# Patient Record
Sex: FEMALE | Race: ASIAN | NOT HISPANIC OR LATINO | URBAN - METROPOLITAN AREA
[De-identification: names, ages, dates, MRNs, and addresses within clinical notes are randomized per-mention and may not be internally consistent; named-entity substitution may affect disease eponyms.]

---

## 2022-06-04 ENCOUNTER — INPATIENT (INPATIENT)
Facility: HOSPITAL | Age: 42
LOS: 1 days | Discharge: HOME | End: 2022-06-06
Attending: OBSTETRICS & GYNECOLOGY | Admitting: OBSTETRICS & GYNECOLOGY
Payer: MEDICAID

## 2022-06-04 VITALS
SYSTOLIC BLOOD PRESSURE: 105 MMHG | DIASTOLIC BLOOD PRESSURE: 64 MMHG | HEART RATE: 78 BPM | TEMPERATURE: 98 F | RESPIRATION RATE: 16 BRPM | OXYGEN SATURATION: 100 %

## 2022-06-04 PROCEDURE — 99285 EMERGENCY DEPT VISIT HI MDM: CPT | Mod: 25

## 2022-06-04 PROCEDURE — 93010 ELECTROCARDIOGRAM REPORT: CPT

## 2022-06-04 NOTE — ED PROVIDER NOTE - NS ED ROS FT
Constitutional: (-) fever   Eyes/ENT: (-) vision changes, (-) hearing changes  Cardiovascular: (-) chest pain, (-) wheezing  Respiratory: (-) cough, (-) shortness of breath  Gastrointestinal:  (+) diarrhea, (+) abdominal cramping  : (-) dysuria   Musculoskeletal: (-) back pain  Integumentary: (-) rash, (-) edema  Neurological: (-)loc  Allergic/Immunologic: (-) pruritus  Endocrine: No history of thyroid disease

## 2022-06-04 NOTE — ED PROVIDER NOTE - ATTENDING CONTRIBUTION TO CARE
Attending note:   After face to face evaluation of this patient, I concur with above noted hx, pe, and care plan for this patient.  Webster: 41 yof  with LMP likely 1st week of March found to have missed Ab on . Pt noted vaginal bleeding and crampy pain today and was seen by OB who gave Cytotec. Pt then had heavy vaginal bleeding 6-8 soaked pads an hour with lightheadedness and syncope witnessed by family x 2, no trauma. Pt denies any chest pain or SOB. Pt also noted some diarrhea  prior to second syncopal episode. Pt is well appearing, no distress, not pale, vitals stable, able to ambulate, clear lungs, RRR, abd soft with no areas of tn, pelvic exam shows no minimal vaginal bleeding and os closed. no edema.  EKG NSR.   Pt with syncopal episode likely vasovagal after vaginal bleeding but vitals stable now and pt not pale (has hx of anemia) - concern for retained products and continued bleeding - labs and US ordered.

## 2022-06-04 NOTE — ED PROVIDER NOTE - PHYSICAL EXAMINATION
Vitals: I have reviewed the patients vital signs  General: tearful but nontoxic appearing  HEENT: Atraumatic, normocephalic, airway patent  Eyes: EOMI, tracking appropriately, no subconj pallor  Neck: no tracheal deviation, no JVD  Chest/Lungs: no trauma, symmetric chest rise, speaking in complete sentences, no WOB  Heart: skin and extremities well perfused, regular rate and rhythm  Neuro: A+Ox3, ambulating without difficulty, CN grossly intact  MSK: strength at baseline in all extremities, no muscle wasting or atrophy  Skin: no cyanosis, no jaundice, no new emergent lesions  Abd: SNT no rebound or guarding

## 2022-06-04 NOTE — ED PROVIDER NOTE - PROGRESS NOTE DETAILS
Rito: pelvic exam chaperone Dr Webster scant blood in vault, one small clot with no POC evacuated, cervix open Rito: pelvic exam chaperone Dr Webster scant blood in vault, one small clot with no POC evacuated, cervical OS closed Darin: Vitals stable, US images reviewed and OB gyn consult called for retained products. Results discussed with pt and family at bedside. Rito: TBA ob

## 2022-06-04 NOTE — ED PROVIDER NOTE - OBJECTIVE STATEMENT
42 y/o F hx anemia  ?9 weeks pregnant, at baseline has metrorrhagia and believes LMP was at the end of march. 4 weeks ago went to OB and was found to have spont AB, returned today due to not passing POC. Was given cytotec in office, shortly before cytotec and worsening afterwards she started having profuse vaginal bleeding with clots, >8 pads changed. Had 2 syncopal episodes since described as 1- while on toilet having BM and 2- after moving from seated to standing when walking, no headstrike. No urinary sx or back pain. Minimal abd pain just slight cramping.     OB Dr Morris

## 2022-06-04 NOTE — ED ADULT TRIAGE NOTE - CHIEF COMPLAINT QUOTE
presents with heavy vaginal bleeding that started today, has been using 6-8pads/hr, confirmed with OBGYN that she's having a miscarriage, took full dose of Cytotec today. Had two witnessed syncopal episodes at home by her . LOC ~1min, denies any headstrike. PMHX Anemia ~9 weeks pregnant, presents with heavy vaginal bleeding that started today, has been using 6-8pads/hr, confirmed with OBGYN that she's having a miscarriage, took full dose of Cytotec today. Had two witnessed syncopal episodes at home by her . LOC ~1min, denies any headstrike. PMHX Anemia

## 2022-06-04 NOTE — ED PROVIDER NOTE - CLINICAL SUMMARY MEDICAL DECISION MAKING FREE TEXT BOX
9wk pregnant female with confirmed AB given cytotec today here with vaginal bleeding and syncope x2, at baseline has anemia but does not know her last hgb (not on iron). Syncopal episodes c/w vasovagal/orthostasis very low likelihood of cardiac will get screening ekg for intervals. Labs for hgb and hcg. TVUS to eval POC, +/- GYN consult. dispo pending

## 2022-06-05 VITALS — SYSTOLIC BLOOD PRESSURE: 108 MMHG | DIASTOLIC BLOOD PRESSURE: 70 MMHG

## 2022-06-05 DIAGNOSIS — O02.1 MISSED ABORTION: ICD-10-CM

## 2022-06-05 LAB
ALBUMIN SERPL ELPH-MCNC: 4.2 G/DL — SIGNIFICANT CHANGE UP (ref 3.3–5)
ALP SERPL-CCNC: 46 U/L — SIGNIFICANT CHANGE UP (ref 40–120)
ALT FLD-CCNC: 17 U/L — SIGNIFICANT CHANGE UP (ref 4–33)
ANION GAP SERPL CALC-SCNC: 13 MMOL/L — SIGNIFICANT CHANGE UP (ref 7–14)
APPEARANCE UR: CLEAR — SIGNIFICANT CHANGE UP
AST SERPL-CCNC: 28 U/L — SIGNIFICANT CHANGE UP (ref 4–32)
BACTERIA # UR AUTO: NEGATIVE — SIGNIFICANT CHANGE UP
BASOPHILS # BLD AUTO: 0.02 K/UL — SIGNIFICANT CHANGE UP (ref 0–0.2)
BASOPHILS # BLD AUTO: 0.05 K/UL — SIGNIFICANT CHANGE UP (ref 0–0.2)
BASOPHILS NFR BLD AUTO: 0.2 % — SIGNIFICANT CHANGE UP (ref 0–2)
BASOPHILS NFR BLD AUTO: 0.6 % — SIGNIFICANT CHANGE UP (ref 0–2)
BILIRUB SERPL-MCNC: 0.6 MG/DL — SIGNIFICANT CHANGE UP (ref 0.2–1.2)
BILIRUB UR-MCNC: NEGATIVE — SIGNIFICANT CHANGE UP
BLD GP AB SCN SERPL QL: NEGATIVE — SIGNIFICANT CHANGE UP
BUN SERPL-MCNC: 10 MG/DL — SIGNIFICANT CHANGE UP (ref 7–23)
CALCIUM SERPL-MCNC: 9 MG/DL — SIGNIFICANT CHANGE UP (ref 8.4–10.5)
CHLORIDE SERPL-SCNC: 96 MMOL/L — LOW (ref 98–107)
CO2 SERPL-SCNC: 19 MMOL/L — LOW (ref 22–31)
COLOR SPEC: COLORLESS — SIGNIFICANT CHANGE UP
CREAT SERPL-MCNC: 0.54 MG/DL — SIGNIFICANT CHANGE UP (ref 0.5–1.3)
DIFF PNL FLD: ABNORMAL
EGFR: 118 ML/MIN/1.73M2 — SIGNIFICANT CHANGE UP
EOSINOPHIL # BLD AUTO: 0.05 K/UL — SIGNIFICANT CHANGE UP (ref 0–0.5)
EOSINOPHIL # BLD AUTO: 0.17 K/UL — SIGNIFICANT CHANGE UP (ref 0–0.5)
EOSINOPHIL NFR BLD AUTO: 0.5 % — SIGNIFICANT CHANGE UP (ref 0–6)
EOSINOPHIL NFR BLD AUTO: 2.1 % — SIGNIFICANT CHANGE UP (ref 0–6)
EPI CELLS # UR: 1 /HPF — SIGNIFICANT CHANGE UP (ref 0–5)
FLUAV AG NPH QL: SIGNIFICANT CHANGE UP
FLUBV AG NPH QL: SIGNIFICANT CHANGE UP
GLUCOSE SERPL-MCNC: 151 MG/DL — HIGH (ref 70–99)
GLUCOSE UR QL: NEGATIVE — SIGNIFICANT CHANGE UP
HCG SERPL-ACNC: 382.1 MIU/ML — SIGNIFICANT CHANGE UP
HCT VFR BLD CALC: 25.1 % — LOW (ref 34.5–45)
HCT VFR BLD CALC: 30.6 % — LOW (ref 34.5–45)
HGB BLD-MCNC: 10.5 G/DL — LOW (ref 11.5–15.5)
HGB BLD-MCNC: 8.7 G/DL — LOW (ref 11.5–15.5)
HYALINE CASTS # UR AUTO: 1 /LPF — SIGNIFICANT CHANGE UP (ref 0–7)
IANC: 5.86 K/UL — SIGNIFICANT CHANGE UP (ref 1.8–7.4)
IANC: 7.62 K/UL — HIGH (ref 1.8–7.4)
IMM GRANULOCYTES NFR BLD AUTO: 0.4 % — SIGNIFICANT CHANGE UP (ref 0–1.5)
IMM GRANULOCYTES NFR BLD AUTO: 0.4 % — SIGNIFICANT CHANGE UP (ref 0–1.5)
KETONES UR-MCNC: NEGATIVE — SIGNIFICANT CHANGE UP
LEUKOCYTE ESTERASE UR-ACNC: ABNORMAL
LYMPHOCYTES # BLD AUTO: 1.04 K/UL — SIGNIFICANT CHANGE UP (ref 1–3.3)
LYMPHOCYTES # BLD AUTO: 1.73 K/UL — SIGNIFICANT CHANGE UP (ref 1–3.3)
LYMPHOCYTES # BLD AUTO: 11.4 % — LOW (ref 13–44)
LYMPHOCYTES # BLD AUTO: 20.9 % — SIGNIFICANT CHANGE UP (ref 13–44)
MCHC RBC-ENTMCNC: 34.3 GM/DL — SIGNIFICANT CHANGE UP (ref 32–36)
MCHC RBC-ENTMCNC: 34.7 GM/DL — SIGNIFICANT CHANGE UP (ref 32–36)
MCHC RBC-ENTMCNC: 35.2 PG — HIGH (ref 27–34)
MCHC RBC-ENTMCNC: 35.4 PG — HIGH (ref 27–34)
MCV RBC AUTO: 101.6 FL — HIGH (ref 80–100)
MCV RBC AUTO: 103 FL — HIGH (ref 80–100)
MONOCYTES # BLD AUTO: 0.34 K/UL — SIGNIFICANT CHANGE UP (ref 0–0.9)
MONOCYTES # BLD AUTO: 0.44 K/UL — SIGNIFICANT CHANGE UP (ref 0–0.9)
MONOCYTES NFR BLD AUTO: 3.7 % — SIGNIFICANT CHANGE UP (ref 2–14)
MONOCYTES NFR BLD AUTO: 5.3 % — SIGNIFICANT CHANGE UP (ref 2–14)
NEUTROPHILS # BLD AUTO: 5.86 K/UL — SIGNIFICANT CHANGE UP (ref 1.8–7.4)
NEUTROPHILS # BLD AUTO: 7.62 K/UL — HIGH (ref 1.8–7.4)
NEUTROPHILS NFR BLD AUTO: 70.7 % — SIGNIFICANT CHANGE UP (ref 43–77)
NEUTROPHILS NFR BLD AUTO: 83.8 % — HIGH (ref 43–77)
NITRITE UR-MCNC: NEGATIVE — SIGNIFICANT CHANGE UP
NRBC # BLD: 0 /100 WBCS — SIGNIFICANT CHANGE UP
NRBC # BLD: 0 /100 WBCS — SIGNIFICANT CHANGE UP
NRBC # FLD: 0 K/UL — SIGNIFICANT CHANGE UP
NRBC # FLD: 0 K/UL — SIGNIFICANT CHANGE UP
PH UR: 6.5 — SIGNIFICANT CHANGE UP (ref 5–8)
PLATELET # BLD AUTO: 162 K/UL — SIGNIFICANT CHANGE UP (ref 150–400)
PLATELET # BLD AUTO: 164 K/UL — SIGNIFICANT CHANGE UP (ref 150–400)
POTASSIUM SERPL-MCNC: 4.4 MMOL/L — SIGNIFICANT CHANGE UP (ref 3.5–5.3)
POTASSIUM SERPL-SCNC: 4.4 MMOL/L — SIGNIFICANT CHANGE UP (ref 3.5–5.3)
PROT SERPL-MCNC: 6.8 G/DL — SIGNIFICANT CHANGE UP (ref 6–8.3)
PROT UR-MCNC: NEGATIVE — SIGNIFICANT CHANGE UP
RBC # BLD: 2.47 M/UL — LOW (ref 3.8–5.2)
RBC # BLD: 2.97 M/UL — LOW (ref 3.8–5.2)
RBC # FLD: 12.1 % — SIGNIFICANT CHANGE UP (ref 10.3–14.5)
RBC # FLD: 12.2 % — SIGNIFICANT CHANGE UP (ref 10.3–14.5)
RBC CASTS # UR COMP ASSIST: 9 /HPF — HIGH (ref 0–4)
RH IG SCN BLD-IMP: POSITIVE — SIGNIFICANT CHANGE UP
RH IG SCN BLD-IMP: POSITIVE — SIGNIFICANT CHANGE UP
RSV RNA NPH QL NAA+NON-PROBE: SIGNIFICANT CHANGE UP
SARS-COV-2 RNA SPEC QL NAA+PROBE: SIGNIFICANT CHANGE UP
SODIUM SERPL-SCNC: 128 MMOL/L — LOW (ref 135–145)
SP GR SPEC: 1.01 — SIGNIFICANT CHANGE UP (ref 1–1.05)
UROBILINOGEN FLD QL: SIGNIFICANT CHANGE UP
WBC # BLD: 8.28 K/UL — SIGNIFICANT CHANGE UP (ref 3.8–10.5)
WBC # BLD: 9.11 K/UL — SIGNIFICANT CHANGE UP (ref 3.8–10.5)
WBC # FLD AUTO: 8.28 K/UL — SIGNIFICANT CHANGE UP (ref 3.8–10.5)
WBC # FLD AUTO: 9.11 K/UL — SIGNIFICANT CHANGE UP (ref 3.8–10.5)
WBC UR QL: 1 /HPF — SIGNIFICANT CHANGE UP (ref 0–5)

## 2022-06-05 PROCEDURE — 76830 TRANSVAGINAL US NON-OB: CPT | Mod: 26

## 2022-06-05 PROCEDURE — 88305 TISSUE EXAM BY PATHOLOGIST: CPT | Mod: 26

## 2022-06-05 PROCEDURE — 59812 TREATMENT OF MISCARRIAGE: CPT

## 2022-06-05 RX ORDER — SODIUM CHLORIDE 9 MG/ML
1000 INJECTION, SOLUTION INTRAVENOUS
Refills: 0 | Status: DISCONTINUED | OUTPATIENT
Start: 2022-06-05 | End: 2022-06-05

## 2022-06-05 RX ORDER — SODIUM CHLORIDE 9 MG/ML
1000 INJECTION INTRAMUSCULAR; INTRAVENOUS; SUBCUTANEOUS
Refills: 0 | Status: DISCONTINUED | OUTPATIENT
Start: 2022-06-05 | End: 2022-06-06

## 2022-06-05 RX ORDER — SODIUM CHLORIDE 9 MG/ML
1000 INJECTION INTRAMUSCULAR; INTRAVENOUS; SUBCUTANEOUS ONCE
Refills: 0 | Status: COMPLETED | OUTPATIENT
Start: 2022-06-05 | End: 2022-06-05

## 2022-06-05 RX ORDER — IBUPROFEN 200 MG
600 TABLET ORAL EVERY 6 HOURS
Refills: 0 | Status: DISCONTINUED | OUTPATIENT
Start: 2022-06-05 | End: 2022-06-06

## 2022-06-05 RX ORDER — INFLUENZA VIRUS VACCINE 15; 15; 15; 15 UG/.5ML; UG/.5ML; UG/.5ML; UG/.5ML
0.5 SUSPENSION INTRAMUSCULAR ONCE
Refills: 0 | Status: DISCONTINUED | OUTPATIENT
Start: 2022-06-05 | End: 2022-06-06

## 2022-06-05 RX ADMIN — SODIUM CHLORIDE 125 MILLILITER(S): 9 INJECTION, SOLUTION INTRAVENOUS at 05:48

## 2022-06-05 RX ADMIN — SODIUM CHLORIDE 125 MILLILITER(S): 9 INJECTION INTRAMUSCULAR; INTRAVENOUS; SUBCUTANEOUS at 07:16

## 2022-06-05 RX ADMIN — SODIUM CHLORIDE 1000 MILLILITER(S): 9 INJECTION INTRAMUSCULAR; INTRAVENOUS; SUBCUTANEOUS at 03:14

## 2022-06-05 RX ADMIN — SODIUM CHLORIDE 125 MILLILITER(S): 9 INJECTION INTRAMUSCULAR; INTRAVENOUS; SUBCUTANEOUS at 08:39

## 2022-06-05 NOTE — ASU DISCHARGE PLAN (ADULT/PEDIATRIC) - NS MD DC FALL RISK RISK
For information on Fall & Injury Prevention, visit: [email or web address] OR  [email or web address] OR  https://www.cdc.gov/steadi/patient.html

## 2022-06-05 NOTE — PATIENT PROFILE ADULT - VISION (WITH CORRECTIVE LENSES IF THE PATIENT USUALLY WEARS THEM):
patient wears glasses for driving, nearsighted/Normal vision: sees adequately in most situations; can see medication labels, newsprint

## 2022-06-05 NOTE — PACU DISCHARGE NOTE - NSCLINEINSERTRD_GEN_ALL_CORE
Relevant Problems   No relevant active problems       Anesthetic History   No history of anesthetic complications            Review of Systems / Medical History  Patient summary reviewed and nursing notes reviewed    Pulmonary            Asthma : well controlled       Neuro/Psych   Within defined limits           Cardiovascular    Hypertension: well controlled          Past MI and CAD    Exercise tolerance: >4 METS  Comments: Plavix last dose = 12/5/2021  Silent MI 2009 treating medically (no surgery nor stents)    \"Needs knee fixed to continue his running\"   GI/Hepatic/Renal     GERD: well controlled           Endo/Other        Arthritis     Other Findings              Physical Exam    Airway  Mallampati: II  TM Distance: 4 - 6 cm  Neck ROM: normal range of motion   Mouth opening: Normal     Cardiovascular  Regular rate and rhythm,  S1 and S2 normal,  no murmur, click, rub, or gallop  Rhythm: regular  Rate: normal         Dental    Dentition: Lower dentition intact, Upper dentition intact and Caps/crowns     Pulmonary  Breath sounds clear to auscultation               Abdominal         Other Findings            Anesthetic Plan    ASA: 3  Anesthesia type: general            Anesthetic plan and risks discussed with: Patient      Informed consent obtained.
No

## 2022-06-05 NOTE — H&P ADULT - HISTORY OF PRESENT ILLNESS
REZA ODONNELL  41y  Female [id number]    HPI: 41 y.o.  LMP unknown presenting s/p syncopal episodes and heavy vaginal bleeding in the setting of pregnancy admitted to GYN service for surgical management of incomplete . Patient reports that she did not know she was pregnant but went to walk in OBGYN clinic and was told she had a missed  a few weeks prior and opted for expectant management. She began to experience spotting approximately 3 days prior. She went for a follow up ultrasound one day prior and was told the pregnancy was still present and was given cytotec. Prior to taking the cytotec she began to experience heavy vaginal bleeding, passing large clots and "chunks". She then had two episodes of syncope at home that were witnessed, the first lasting one minute and the next a few seconds. NPO since 9PM. She denies any fevers, chills, NVD. Since arriving to the ED her bleeding has significantly slowed down. She denies any abdominal pain.      Ob/Gyn Physician: N/A    Obhx: first pregnancy  GynHx: Denies fibroids, cysts, abnormal pap smears, STIs  PMH: anemia  PSH: denies  FMH:   - Father: stomach CA   - Mother: Breast CA  Social:   - social alcohol use   - previous smoker   - THC edibles, used to smoke marijuana daily but quit 6 months prior  Psych: denies anxiety/depression  Meds: Vitamins  Allergies:   - [place] red -> anaphylaxis

## 2022-06-05 NOTE — BRIEF OPERATIVE NOTE - NSICDXBRIEFPROCEDURE_GEN_ALL_CORE_FT
PROCEDURES:  Dilation and curettage of uterus using suction for incomplete  2022 17:06:56  Arjun Alford

## 2022-06-05 NOTE — ED ADULT NURSE NOTE - OBJECTIVE STATEMENT
Patient presents to ED room 25, A&04 ambulatory at baseline coming in complaining of vaginal bleeding. Pt is , about 9 weeks pregnant LMP was at the end of march. Patient had a spontaneous  4 weeks ago confirmed by OB and US. was given cytotec in office and had multiple episodes of vaginal bleeding. Prior to arrival 7-8 saturated pads/hour. Patient had 2 witnessed syncopal episodes today, 1 on toilet having a BM and second after standing up to walk. Denies head trauma. Respirations even and unlabored. Lung sounds clear with equal chest rise bilaterally. ABD is soft, non tender, non distended with normal active bowel sounds. No complaints of chest pain, headache, nausea, dizziness, vomiting  SOB, fever, chills, urinary symptoms verbalized. 20GRhand in place, labs sent, awaiting OB consult. NSR on cardiac monitor.

## 2022-06-05 NOTE — H&P ADULT - NSHPPHYSICALEXAM_GEN_ALL_CORE
Vital Signs Last 24 Hrs  T(C): 36.4 (04 Jun 2022 23:11), Max: 36.4 (04 Jun 2022 23:11)  T(F): 97.5 (04 Jun 2022 23:11), Max: 97.5 (04 Jun 2022 23:11)  HR: 60 (05 Jun 2022 03:47) (60 - 78)  BP: 101/70 (05 Jun 2022 03:47) (101/70 - 105/64)  BP(mean): --  RR: 16 (05 Jun 2022 03:47) (16 - 16)  SpO2: 100% (05 Jun 2022 03:47) (100% - 100%)    Physical Exam:   General: sitting comfortably in bed, NAD   CV: RR S1S2 no m/r/g  Lungs: CTA b/l, unlabored on RA  Back: No CVA tenderness  Abd: Soft, non-tender, non-distended,  +BS  :  Minimal bleeding on pad  External labia wnl.   Speculum Exam: Approximately 20cc of bright red blood in vaginal vault.  Physiologic discharge.  Bimanual exam with no cervical motion tenderness, uterus wnl, adnexa non palpable b/l.  Cervix closed, soft  Ext: non-tender b/l, no edema   Skin: no rashes or lesions

## 2022-06-05 NOTE — H&P ADULT - NSICDXFAMILYHX_GEN_ALL_CORE_FT
FAMILY HISTORY:  Father  Still living? Unknown  FH: stomach cancer, Age at diagnosis: Age Unknown    Mother  Still living? Unknown  FH: breast cancer, Age at diagnosis: Age Unknown

## 2022-06-05 NOTE — PATIENT PROFILE ADULT - FALL HARM RISK - HARM RISK INTERVENTIONS

## 2022-06-05 NOTE — H&P ADULT - NSHPSOCIALHISTORY_GEN_ALL_CORE
Social:   - social alcohol use   - previous smoker   - THC edibles, used to smoke marijuana daily but quit 6 months prior  Psych: denies anxiety/depression

## 2022-06-05 NOTE — H&P ADULT - NSHPLABSRESULTS_GEN_ALL_CORE
LABS:                              10.5   9.11  )-----------( 162      ( 2022 23:58 )             30.6     06-04    128<L>  |  96<L>  |  10  ----------------------------<  151<H>  4.4   |  19<L>  |  0.54    Ca    9.0      2022 23:58    TPro  6.8  /  Alb  4.2  /  TBili  0.6  /  DBili  x   /  AST  28  /  ALT  17  /  AlkPhos  46  06-04    I&O's Detail      Urinalysis Basic - ( 2022 00:34 )    Color: Colorless / Appearance: Clear / S.006 / pH: x  Gluc: x / Ketone: Negative  / Bili: Negative / Urobili: <2 mg/dL   Blood: x / Protein: Negative / Nitrite: Negative   Leuk Esterase: Small / RBC: 9 /HPF / WBC 1 /HPF   Sq Epi: x / Non Sq Epi: 1 /HPF / Bacteria: Negative        RADIOLOGY & ADDITIONAL STUDIES:    < from: US Transvaginal (22 @ 01:29) >      ACC: [id number] EXAM:  US TRANSVAGINAL                          PROCEDURE DATE:  2022          INTERPRETATION:  CLINICAL INFORMATION: Vaginal bleeding    LMP: Unknown    COMPARISON: None available.    TECHNIQUE:  Endovaginal pelvic sonogram only.    FINDINGS:  Uterus: 11.3 cm x 5.2 cm x 5.5 cm. 2.3 x 2.4 x 2.4 cm posterior body   intramural fibroid. No normal intrauterine pregnancy is visualized.   Marked distention of the cervical canal, measuring 3.3 cm.  Endometrium: 12 mm. Anechoic fluid is noted in the endometrial canal.   There is marked fluid distention of the lower uterine segment and   cervical canal with a 1 cm focal soft tissue focus likely representing    in progress.    Right ovary: Not visualized  Left ovary: Not visualized    Fluid: None.    IMPRESSION:  No normal intrauterine pregnancy is visualized.  Findings favor  in progress/incomplete .        --- End of Report ---          ANNETTA GARCIA MD; Resident Radiologist  This document has been electronically signed.  CORINNE ELIAS MD; Attending Radiologist  This document has been electronically signed. 2022  1:57AM    < end of copied text >

## 2022-06-05 NOTE — ED ADULT NURSE NOTE - SUICIDE SCREENING QUESTION 1
Spoke with mother regarding situation, provided information regarding available resources and brief behavioral interventions that may be of use. Mother confirmed understanding. No

## 2022-06-05 NOTE — H&P ADULT - ASSESSMENT
41 y.o.  LMP unknown presenting s/p syncopal episodes and heavy vaginal bleeding in the setting of pregnancy admitted to GYN service for surgical management of incomplete .    Plan OR:   Neuro: IV pain medication prn  CV:  Patient hemodynamically stable- will continue to monitor vitals closely.   Pulm: saturating well on room air   GI: NPO for OR   : no current issues  Reproductive:  patient to go to OR for exam under anesthesia, dilation and curettage with suction. Patient counseled on risks of surgery including bleeding, infection and damage to surrounding organs.  All questions/concerns of patient addressed. All consents signed with patient.    Heme: SCD's in OR for DVT ppx.  Aggressive and early ambulation post-operatively for DVT ppx.   ID: afebrile   FEN: LR@125.  Replete electolytes prn   Dispo: To OR for procedure as detailed above      Patient d/w Dr. Antwon Duque PGY-2

## 2022-06-05 NOTE — BRIEF OPERATIVE NOTE - OPERATION/FINDINGS
Exam under anesthesia revealed a 8 week size anteverted uterus. Vagina appeared normal. Cervix dilated by products of conception through external os. Transabdominal US showed clean endometrial stripe at the conclusion of the procedure.

## 2022-06-05 NOTE — PRE-OP CHECKLIST - SELECT TESTS ORDERED
BMP/CBC/CMP/PT/PTT/Type and Screen BMP/CBC/CMP/PT/PTT/Type and Screen/HCG/COVID-19 BMP/CBC/CMP/PT/PTT/Type and Screen/HCG/EKG/COVID-19

## 2022-06-05 NOTE — ED ADULT NURSE NOTE - CHIEF COMPLAINT QUOTE
~9 weeks pregnant, presents with heavy vaginal bleeding that started today, has been using 6-8pads/hr, confirmed with OBGYN that she's having a miscarriage, took full dose of Cytotec today. Had two witnessed syncopal episodes at home by her . LOC ~1min, denies any headstrike. PMHX Anemia

## 2022-06-09 LAB — SURGICAL PATHOLOGY STUDY: SIGNIFICANT CHANGE UP

## 2023-12-13 NOTE — ED ADULT NURSE NOTE - WILL THE PATIENT ACCEPT THE PFIZER COVID-19 VACCINE IF ELIGIBLE AND IT IS AVAILABLE?
[FreeTextEntry1] : The patient was advised of the diagnosis. The natural history of the pathology was explained in full to the patient in layman's terms. All questions were answered. The risks and benefits of surgical and non-surgical treatment alternatives were explained in full to the patient. We discussed treatment options including nsaids, topical gels, tennis elbow strap, PT, activity modification, cortisone inj, sx .pt is aware that symptoms usually resolve on their own in 95-99% of people, but the timeframe is unknown. Home exercises/stretches were demonstrated and the patient practiced them as well- They are to do the exercises hourly and hold the stretch for 30 seconds.  Avoid repetitive wrist flexion time was spent instructing the patient on appropriate placement of the elbow strap as well.   Pt was instructed on HEP and activity modification. Recommended tennis elbow strap. F/u in 4 weeks No

## (undated) DEVICE — TUBING MEDGYN VACUUM 3/8" COLLECTION SET W/HANDLE